# Patient Record
Sex: MALE | Race: WHITE | NOT HISPANIC OR LATINO | Employment: STUDENT | URBAN - METROPOLITAN AREA
[De-identification: names, ages, dates, MRNs, and addresses within clinical notes are randomized per-mention and may not be internally consistent; named-entity substitution may affect disease eponyms.]

---

## 2023-10-05 ENCOUNTER — HOSPITAL ENCOUNTER (OUTPATIENT)
Dept: CT IMAGING | Facility: CLINIC | Age: 28
Discharge: HOME | End: 2023-10-05
Attending: NURSE PRACTITIONER | Admitting: NURSE PRACTITIONER
Payer: COMMERCIAL

## 2023-10-05 ENCOUNTER — OFFICE VISIT (OUTPATIENT)
Dept: FAMILY MEDICINE | Facility: CLINIC | Age: 28
End: 2023-10-05
Payer: COMMERCIAL

## 2023-10-05 ENCOUNTER — HOSPITAL ENCOUNTER (EMERGENCY)
Facility: CLINIC | Age: 28
Discharge: HOME | End: 2023-10-05
Attending: EMERGENCY MEDICINE | Admitting: EMERGENCY MEDICINE
Payer: COMMERCIAL

## 2023-10-05 VITALS
OXYGEN SATURATION: 97 % | WEIGHT: 182 LBS | SYSTOLIC BLOOD PRESSURE: 120 MMHG | TEMPERATURE: 98.2 F | BODY MASS INDEX: 23.36 KG/M2 | HEIGHT: 74 IN | RESPIRATION RATE: 18 BRPM | HEART RATE: 80 BPM | DIASTOLIC BLOOD PRESSURE: 66 MMHG

## 2023-10-05 VITALS
HEART RATE: 75 BPM | DIASTOLIC BLOOD PRESSURE: 74 MMHG | OXYGEN SATURATION: 98 % | TEMPERATURE: 98.5 F | SYSTOLIC BLOOD PRESSURE: 144 MMHG | RESPIRATION RATE: 16 BRPM

## 2023-10-05 DIAGNOSIS — Z11.4 SCREENING FOR HIV (HUMAN IMMUNODEFICIENCY VIRUS): Primary | ICD-10-CM

## 2023-10-05 DIAGNOSIS — J03.90 TONSILLITIS: ICD-10-CM

## 2023-10-05 DIAGNOSIS — D72.825 BANDEMIA: ICD-10-CM

## 2023-10-05 DIAGNOSIS — Z11.59 NEED FOR HEPATITIS C SCREENING TEST: ICD-10-CM

## 2023-10-05 DIAGNOSIS — J36 PERITONSILLAR ABSCESS: ICD-10-CM

## 2023-10-05 DIAGNOSIS — J02.0 STREPTOCOCCAL SORE THROAT: ICD-10-CM

## 2023-10-05 PROBLEM — F15.10 AMPHETAMINE ABUSE (H): Status: ACTIVE | Noted: 2022-09-28

## 2023-10-05 LAB
ERYTHROCYTE [DISTWIDTH] IN BLOOD BY AUTOMATED COUNT: 12 % (ref 10–15)
HCT VFR BLD AUTO: 40.4 % (ref 40–53)
HGB BLD-MCNC: 13.5 G/DL (ref 13.3–17.7)
MCH RBC QN AUTO: 30.1 PG (ref 26.5–33)
MCHC RBC AUTO-ENTMCNC: 33.4 G/DL (ref 31.5–36.5)
MCV RBC AUTO: 90 FL (ref 78–100)
PLATELET # BLD AUTO: 442 10E3/UL (ref 150–450)
RBC # BLD AUTO: 4.49 10E6/UL (ref 4.4–5.9)
WBC # BLD AUTO: 17.1 10E3/UL (ref 4–11)

## 2023-10-05 PROCEDURE — 36415 COLL VENOUS BLD VENIPUNCTURE: CPT | Performed by: NURSE PRACTITIONER

## 2023-10-05 PROCEDURE — 250N000009 HC RX 250: Performed by: NURSE PRACTITIONER

## 2023-10-05 PROCEDURE — 42700 I&D ABSCESS PERITONSILLAR: CPT

## 2023-10-05 PROCEDURE — 70491 CT SOFT TISSUE NECK W/DYE: CPT

## 2023-10-05 PROCEDURE — 85027 COMPLETE CBC AUTOMATED: CPT | Performed by: NURSE PRACTITIONER

## 2023-10-05 PROCEDURE — 99203 OFFICE O/P NEW LOW 30 MIN: CPT | Performed by: NURSE PRACTITIONER

## 2023-10-05 PROCEDURE — 250N000011 HC RX IP 250 OP 636: Performed by: NURSE PRACTITIONER

## 2023-10-05 PROCEDURE — 99283 EMERGENCY DEPT VISIT LOW MDM: CPT | Mod: 25

## 2023-10-05 RX ORDER — PREDNISONE 20 MG/1
20 TABLET ORAL DAILY
Qty: 5 TABLET | Refills: 0 | Status: SHIPPED | OUTPATIENT
Start: 2023-10-05 | End: 2023-10-10

## 2023-10-05 RX ORDER — CEPHALEXIN 500 MG/1
500 CAPSULE ORAL 4 TIMES DAILY
Qty: 40 CAPSULE | Refills: 0 | Status: SHIPPED | OUTPATIENT
Start: 2023-10-05 | End: 2023-10-05

## 2023-10-05 RX ORDER — IOPAMIDOL 755 MG/ML
100 INJECTION, SOLUTION INTRAVASCULAR ONCE
Status: COMPLETED | OUTPATIENT
Start: 2023-10-05 | End: 2023-10-05

## 2023-10-05 RX ADMIN — SODIUM CHLORIDE 60 ML: 9 INJECTION, SOLUTION INTRAVENOUS at 15:08

## 2023-10-05 RX ADMIN — IOPAMIDOL 100 ML: 755 INJECTION, SOLUTION INTRAVENOUS at 15:08

## 2023-10-05 ASSESSMENT — ENCOUNTER SYMPTOMS: SORE THROAT: 1

## 2023-10-05 ASSESSMENT — ACTIVITIES OF DAILY LIVING (ADL): ADLS_ACUITY_SCORE: 35

## 2023-10-05 NOTE — PROGRESS NOTES
Answers submitted by the patient for this visit:  General Questionnaire (Submitted on 10/5/2023)  Chief Complaint: Chronic problems general questions HPI Form  How many servings of fruits and vegetables do you eat daily?: 2-3  On average, how many sweetened beverages do you drink each day (Examples: soda, juice, sweet tea, etc.  Do NOT count diet or artificially sweetened beverages)?: 0  How many minutes a day do you exercise enough to make your heart beat faster?: 30 to 60  How many days a week do you exercise enough to make your heart beat faster?: 5  How many days per week do you miss taking your medication?: 0  General Concern (Submitted on 10/5/2023)  Chief Complaint: Chronic problems general questions HPI Form  What is the reason for your visit today?: sore throat  When did your symptoms begin?: 3-4 weeks ago  What are your symptoms?: sore throat, fever, chills,bodyaches,fatigue  How would you describe these symptoms?: Severe  Are your symptoms:: Worsening  Have you had these symptoms before?: No

## 2023-10-05 NOTE — ED PROVIDER NOTES
History     Chief Complaint:  Abnormal Imaging       The history is provided by the patient.      Dany Vasquez is a 28 year old male who presents with a tonsillar abscess that was diagnosed in clinic earlier today. Patient reports that his symptoms began 3 weeks ago with a sore throat. He was seen for this and was prescribed a Z-olvin with incomplete relief. At the time, his strep test was negative. Shortly after this, he had increased throat swelling and airway restriction, so he was seen in clinic today where a CT was performed revealing an abscess. Alongside his sore throat, he has had pain with swallowing, fever, and chills. He has been using ibuprofen for his pain with incomplete relief. Dany has been able to swallow food and water with some difficulty. No allergies to medications. He has had strep throat a couple of times in the past.    Independent Historian:   None - Patient Only    Review of External Notes:   I reviewed a clinic note from earlier today.  I reviewed the CT scan from today:  IMPRESSION:    1. Enlarged and edematous tonsils, particularly the left palatine  tonsil, suggestive of tonsillitis. Heterogeneous fluid collection in  the left palatine tonsil measuring up to 2.5 cm concerning for a  peritonsillar abscess.  2. Cervical lymphadenopathy, likely reactive to presumed infectious  tonsillitis. No necrotic lymph nodes. Clinical follow-up is warranted.  3. Mucosal thickening throughout the paranasal sinuses with air-fluid  layers in the maxillary sinuses. This could represent acute sinusitis.     Results discussed with Eliza Nieto at 3:33 PM on 10/5/2023.     Medications:    Keflex  Deltasone  Z-olvin  Wellbutrin XL  Naltrexone    Past Medical History:    Ankle fracture  Amphetamine abuse  Mucocele of salivary gland   Contact dermatitis and other eczema   Nocturnal enuresis     Past Surgical History:    Ankle surgery, right     Physical Exam   Patient Vitals for the past 24 hrs:    BP Temp Temp src Pulse Resp SpO2   10/05/23 1555 (!) 144/74 98.5  F (36.9  C) Temporal 75 16 98 %      Physical Exam  Gen:  Pleasant, appears stated age.    ENT:  Moist mucus membranes.  Normal tongue.  Bilateral tonsillar hypertrophy, left tonsil with exudate.  Deviation of the uvula to the right, with obvious swelling to the left peritonsillar area.    No anterior cervical JAJA.  Tracheal cartilage non-tender.  Full flexion and extension of neck.  Thickened voice.  No stridor.  No drooling.    Musculoskeletal:     Normal movement of all extremities without evidence for deficit.    Extremities:    No edema.  Atraumatic.      Skin:   Warm and dry.  No rash.    Neurologic:    Non-focal exam without asymmetric weakness or numbness.    Fluent speech.    Psychiatric:     Normal affect with appropriate interaction with examiner.      Emergency Department Course     Procedures       Incision and Drainage     Procedure: Incision and Drainage     Consent: Verbal    Indication: Abscess    Location: Throat    Size: 2.5 cm    Ultrasound Guidance: No    Preparation: None     Anesthesia/Sedation: Bupivacaine - 0.5%     Procedure Detail:    Aspiration: Yes, 3 ml of purulent fluid was removed.    Patient Status: The patient tolerated the procedure well: Yes. There were no complications.    Emergency Department Course & Assessments:       Assessments:  1639 I obtained history and examined the patient as noted above.     Independent Interpretation (X-rays, CTs, rhythm strip):  I reviewed the CT soft tissue neck images.    Consultations/Discussion of Management or Tests:  7655 I spoke with Dr. Mendoza from the intensivist service at [facility] regarding the patient's presentation and plan of care.    Social Determinants of Health affecting care:   None    Disposition:  The patient was discharged to home.     Impression & Plan      Medical Decision Making:  Dany Vasquez is a 28 year old male, otherwise healthy, who  presents today with swelling in the left throat, known left peritonsillar abscess following a CT scan in the outpatient setting.  On exam, the patient is afebrile, no respiratory distress, mild trismus.  Peritonsillar abscess was drained as above with purulent drainage and relief of symptoms.  We discussed findings of sinusitis on the CT scan.  He feels better, and I recommended changing antibiotics to Augmentin rather than Keflex.  Return to the ED right away for increased pain, difficulty swallowing, fever, or other concerns.    Diagnosis:    ICD-10-CM    1. Peritonsillar abscess  J36          Discharge Medications:  Discharge Medication List as of 10/5/2023  5:27 PM        START taking these medications    Details   amoxicillin-clavulanate (AUGMENTIN) 875-125 MG tablet Take 1 tablet by mouth 2 times daily for 10 days, Disp-20 tablet, R-0, Local Print            Scribe Disclosure:  I, Tonio Weiss, am serving as a scribe at 4:53 PM on 10/5/2023 to document services personally performed by Natasha Varela MD, based on my observations and the provider's statements to me.   10/5/2023   Natasha Varela MD Pepper, Tracy Lynn, MD  10/06/23 1254

## 2023-10-05 NOTE — PROGRESS NOTES
Assessment & Plan     Screening for HIV (human immunodeficiency virus)  deferred    Need for hepatitis C screening test  deferred    Streptococcal sore throat  - check labs, ?bacterial vs viral infection  - CBC with platelets  - CBC with platelets    Bandemia  - will treat leukocytosis and antibiotics and prednisone for the posterior pharynx swelling  - predniSONE (DELTASONE) 20 MG tablet  Dispense: 5 tablet; Refill: 0    Tonsillitis  - due to presentation of deviated palpate and exudative left tonsil along with voice change and air way compromise will get stat CT scan for possible peritonsillar abscess with low threshold for refer to ED for management                                   .   - predniSONE (DELTASONE) 20 MG tablet  Dispense: 5 tablet; Refill: 0    Eliza Nieto NP  [facility] is a 28 year old, presenting for the following health issues:  Pharyngitis        10/5/2023     9:56 AM   Additional Questions   Roomed by Pinky PALOMINO CMA       History of Present Illness       Reason for visit:  Sore throat  Symptom onset:  3-4 weeks ago  Symptoms include:  Sore throat, fever, chills,bodyaches,fatigue  Symptom intensity:  Severe  Symptom progression:  Worsening  Had these symptoms before:  No    He eats 2-3 servings of fruits and vegetables daily.He consumes 0 sweetened beverage(s) daily.He exercises with enough effort to increase his heart rate 30 to 60 minutes per day.  He exercises with enough effort to increase his heart rate 5 days per week.   He is taking medications regularly.     COVID home test 2 weeks ago, NEG.   [facility] neg strep test 2 weeks ago. Had some antibiotics Z-pack.  Did not get better.     He is having fevers, hard to breath with his throat, he had tried OTC medications and it is not helping, he state that his voice is being affected by his throat. Covid test was negative, strep swab was negative and that at the [facility] clinic.  "Denies upset stomach, vomiting. + headaches. He does still have his tonsils. He does have ear pain as well. He is a non smoker.     Review of Systems   HENT:  Positive for sore throat.       Constitutional, HEENT, cardiovascular, pulmonary, gi and gu systems are negative, except as otherwise noted.      Objective    /66   Pulse 80   Temp 98.2  F (36.8  C) (Oral)   Resp 18   Ht 1.867 m (6' 1.5\")   Wt 82.6 kg (182 lb)   SpO2 97%   BMI 23.69 kg/m    Body mass index is 23.69 kg/m .  Physical Exam   GENERAL: alert, no distress, and ill appearing  EYES: Eyes grossly normal to inspection, PERRL and conjunctivae and sclerae normal  HENT: normal cephalic/atraumatic, both ears: clear effusion and bulging membrane, nasal mucosa edematous , tonsillar hypertrophy, tonsillar erythema, tonsillar exudate, and left deviation of palate with copious exudative material of left tonsil with marked hypertrophy and redness to left tonsil   NECK: bilateral anterior cervical adenopathy  RESP: lungs clear to auscultation - no rales, rhonchi or wheezes  CV: regular rate and rhythm, normal S1 S2, no S3 or S4, no murmur, click or rub, no peripheral edema and peripheral pulses strong  ABDOMEN: soft, nontender, no hepatosplenomegaly, no masses and bowel sounds normal  MS: no gross musculoskeletal defects noted, no edema  NEURO: Normal strength and tone, mentation intact and speech normal  PSYCH: mentation appears normal, affect normal/bright  LYMPH: anterior cervical: positive                      "

## 2023-10-05 NOTE — ED TRIAGE NOTES
Patient was referred to the ED after a Ct scan showed a tonsillar abscess today at the clinic. Patient stated that he has had a sore throat for about three weeks.      Triage Assessment       Row Name 10/05/23 0096       Triage Assessment (Adult)    Airway WDL WDL       Respiratory WDL    Respiratory WDL WDL       Skin Circulation/Temperature WDL    Skin Circulation/Temperature WDL WDL       Cardiac WDL    Cardiac WDL WDL       Peripheral/Neurovascular WDL    Peripheral Neurovascular WDL WDL       Cognitive/Neuro/Behavioral WDL    Cognitive/Neuro/Behavioral WDL WDL

## 2024-04-17 ENCOUNTER — OFFICE VISIT (OUTPATIENT)
Dept: FAMILY MEDICINE | Facility: CLINIC | Age: 29
End: 2024-04-17
Payer: COMMERCIAL

## 2024-04-17 VITALS
TEMPERATURE: 97.2 F | RESPIRATION RATE: 18 BRPM | SYSTOLIC BLOOD PRESSURE: 120 MMHG | OXYGEN SATURATION: 99 % | DIASTOLIC BLOOD PRESSURE: 79 MMHG | HEART RATE: 69 BPM | WEIGHT: 184.8 LBS | BODY MASS INDEX: 22.98 KG/M2 | HEIGHT: 75 IN

## 2024-04-17 DIAGNOSIS — F19.90 SUBSTANCE USE: ICD-10-CM

## 2024-04-17 DIAGNOSIS — L30.8 OTHER ECZEMA: ICD-10-CM

## 2024-04-17 DIAGNOSIS — F51.02 ADJUSTMENT INSOMNIA: ICD-10-CM

## 2024-04-17 DIAGNOSIS — B07.8 COMMON WART: Primary | ICD-10-CM

## 2024-04-17 PROCEDURE — 90715 TDAP VACCINE 7 YRS/> IM: CPT | Performed by: NURSE PRACTITIONER

## 2024-04-17 PROCEDURE — 99213 OFFICE O/P EST LOW 20 MIN: CPT | Mod: 25 | Performed by: NURSE PRACTITIONER

## 2024-04-17 PROCEDURE — 91320 SARSCV2 VAC 30MCG TRS-SUC IM: CPT | Performed by: NURSE PRACTITIONER

## 2024-04-17 PROCEDURE — 90480 ADMN SARSCOV2 VAC 1/ONLY CMP: CPT | Performed by: NURSE PRACTITIONER

## 2024-04-17 PROCEDURE — 90686 IIV4 VACC NO PRSV 0.5 ML IM: CPT | Performed by: NURSE PRACTITIONER

## 2024-04-17 PROCEDURE — 90472 IMMUNIZATION ADMIN EACH ADD: CPT | Performed by: NURSE PRACTITIONER

## 2024-04-17 PROCEDURE — 90471 IMMUNIZATION ADMIN: CPT | Performed by: NURSE PRACTITIONER

## 2024-04-17 RX ORDER — TRIAMCINOLONE ACETONIDE 0.25 MG/G
OINTMENT TOPICAL 2 TIMES DAILY
Qty: 15 G | Refills: 1 | Status: SHIPPED | OUTPATIENT
Start: 2024-04-17

## 2024-04-17 RX ORDER — HYDROXYZINE HYDROCHLORIDE 25 MG/1
25 TABLET, FILM COATED ORAL 3 TIMES DAILY PRN
Qty: 90 TABLET | Refills: 0 | Status: SHIPPED | OUTPATIENT
Start: 2024-04-17

## 2024-04-17 NOTE — PROGRESS NOTES
Assessment & Plan     Common wart  X1 cluster of warts on left 4th knuckle  X1 wart on ventral aspect of 4th left finger  X1 wart right plant of foot  - Adult Dermatology  Referral     - Cryotherapy performed today, see procedure note below     Procedures Performed:   - Cryotherapy procedure note, location(s): plant of right foot, metatarsal area, X1 cluster of warts on left 4th knuckle,dorsal area of hand, X1 wart on ventral aspect of 4th left finger. After verbal consent and discussion of risks and benefits including, but not limited to, dyspigmentation/scar, blister, and pain, 3 verruca lesion(s) was(were) treated with 1-2 mm freeze border for 2 cycles with liquid nitrogen. Post cryotherapy instructions were provided.    Other eczema  X1 scaly patch on right calf- not a wart. Will treat with triamcinolone for 2 weeks  - triamcinolone (KENALOG) 0.025 % external ointment  Dispense: 15 g; Refill: 1    Adjustment insomnia  Substance Use   - hydrOXYzine HCl (ATARAX) 25 MG tablet  Dispense: 90 tablet; Refill: 0  Hx of polysubstance abuse and stopped amphetamines several years ago. Most recently was using Kradum- stopped 6 days ago. Wishing to start a medication to help him with withdrawal symptoms and also to help him sleep as it is hard to falls sleep now that he is off Kradum.  Has hx impatient treatment outside state a few years ago for other drug use.  Not wishing referral to mental health or chemical dependency - he plans to start going back to recovery meetings. He will return to clinic for referral if having difficulty with relapsing using drugs.                      .    Sudha   Dany is a 28 year old, presenting for the following health issues:  OFFICE VISIT  and Recheck Medication (Pt Is here for office visit )        4/17/2024    10:23 AM   Additional Questions   Roomed by jevon         4/17/2024    10:23 AM   Patient Reported Additional Medications   Patient reports taking the following  "new medications no     Dany presents for persistent warts, wishing referral to dermatology.  Tried home remedies and compound W for almost 3 months.    X1 cluster of warts on left 4th knuckle  X1 wart on ventral aspect of 4th left finger  X1 wart right plant of foot  X1 patch of dry skin/wart? on right calf    Hx of polysubstance abuse. Most recently was using Kradum- stopped 6 days ago. Wishing to start a medication to help him with withdrawal symptoms and also to help him sleep as it is hard to falls sleep now that he is off Kradum.  Has hx impatient treatment outside state a few years ago for other drug use.  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  He exercises with enough effort to increase his heart rate 4 days per week.   He is taking medications regularly.                     Objective    /79 (BP Location: Left arm, Patient Position: Sitting, Cuff Size: Adult Regular)   Pulse 69   Temp 97.2  F (36.2  C) (Tympanic)   Resp 18   Ht 1.905 m (6' 3\")   Wt 83.8 kg (184 lb 12.8 oz)   SpO2 99%   BMI 23.10 kg/m    Body mass index is 23.1 kg/m .  Physical Exam   GENERAL: alert and no distress  NECK: no adenopathy, no asymmetry, masses, or scars  RESP: lungs clear to auscultation - no rales, rhonchi or wheezes  CV: regular rate and rhythm, normal S1 S2, no S3 or S4, no murmur, click or rub, no peripheral edema  ABDOMEN: soft, nontender, no hepatosplenomegaly, no masses and bowel sounds normal  MS: no gross musculoskeletal defects noted, no edema  SKIN: no suspicious lesions or rashes and warts            Signed " Electronically by: KATALINA Núñez CNP

## 2024-04-17 NOTE — NURSING NOTE
Prior to immunization administration, verified patients identity using patient s name and date of birth. Please see Immunization Activity for additional information.     Screening Questionnaire for Adult Immunization    Are you sick today?   No   Do you have allergies to medications, food, a vaccine component or latex?   No   Have you ever had a serious reaction after receiving a vaccination?   No   Do you have a long-term health problem with heart, lung, kidney, or metabolic disease (e.g., diabetes), asthma, a blood disorder, no spleen, complement component deficiency, a cochlear implant, or a spinal fluid leak?  Are you on long-term aspirin therapy?   No   Do you have cancer, leukemia, HIV/AIDS, or any other immune system problem?   No   Do you have a parent, brother, or sister with an immune system problem?   No   In the past 3 months, have you taken medications that affect  your immune system, such as prednisone, other steroids, or anticancer drugs; drugs for the treatment of rheumatoid arthritis, Crohn s disease, or psoriasis; or have you had radiation treatments?   No   Have you had a seizure, or a brain or other nervous system problem?   No   During the past year, have you received a transfusion of blood or blood    products, or been given immune (gamma) globulin or antiviral drug?   No   For women: Are you pregnant or is there a chance you could become       pregnant during the next month?   No   Have you received any vaccinations in the past 4 weeks?   No     Immunization questionnaire answers were all negative.      Pt tolerated injection (TDaP, & Covid 19 12+ Pfizer Comirnaty Vaccine). Site (Left & Right Deltoid) was cleansed with alcohol prior to injections. No pain, burning, swelling or redness at the site of the injection. Patient instructed to remain in clinic for 15 minutes afterwards, and to report any adverse reactions    Screening performed by Dianna Ramos RN on 4/17/2024 at 11:30  AM.

## 2024-05-04 ENCOUNTER — HEALTH MAINTENANCE LETTER (OUTPATIENT)
Age: 29
End: 2024-05-04

## 2024-05-09 ENCOUNTER — OFFICE VISIT (OUTPATIENT)
Dept: FAMILY MEDICINE | Facility: CLINIC | Age: 29
End: 2024-05-09
Payer: COMMERCIAL

## 2024-05-09 VITALS
DIASTOLIC BLOOD PRESSURE: 65 MMHG | TEMPERATURE: 98 F | OXYGEN SATURATION: 98 % | SYSTOLIC BLOOD PRESSURE: 111 MMHG | BODY MASS INDEX: 23.51 KG/M2 | RESPIRATION RATE: 14 BRPM | WEIGHT: 189.1 LBS | HEART RATE: 74 BPM | HEIGHT: 75 IN

## 2024-05-09 DIAGNOSIS — B07.8 COMMON WART: Primary | ICD-10-CM

## 2024-05-09 PROCEDURE — 99212 OFFICE O/P EST SF 10 MIN: CPT | Mod: 25 | Performed by: NURSE PRACTITIONER

## 2024-05-09 PROCEDURE — 17110 DESTRUCTION B9 LES UP TO 14: CPT | Performed by: NURSE PRACTITIONER

## 2024-05-09 ASSESSMENT — PAIN SCALES - GENERAL: PAINLEVEL: NO PAIN (0)

## 2024-05-09 NOTE — PROGRESS NOTES
Assessment & Plan     Dany was seen today for wart.    Diagnoses and all orders for this visit:    Common wart  -     DESTRUCT BENIGN LESION, UP TO 14     - Cryotherapy performed today, see procedure note below     Procedures Performed:   - Cryotherapy procedure note, location(s): x1 plantar wart on right foot, metatarsal area.    X1 cluster of warts on left 4th knuckle,dorsal area of hand  After verbal consent and discussion of risks and benefits including, but not limited to, dyspigmentation/scar, blister, pain, and risk for infection x1 cluster verruca lesion(s) on hand were treated with 1-2 mm freeze border with 2 cycles of liquid nitrogen. Plantar wart was paired with 10mm scalpel and then treated with 1-2 mm freeze border with 3 freeze and thaw cycles of liquid nitrogen.  Post cryotherapy instructions were provided.  Apply bacitracin if blisters develop and rupture to prevent infection.                    Subjective   Dany is a 29 year old, presenting for the following health issues:  Wart (Wart Removal. Left hand and bottom of right foot. /Pt does have a future dermatology appt: November 7, 2024)        5/9/2024     2:10 PM   Additional Questions   Roomed by Dianna Saenz presents for follow up- wart removal  Was seen 4/17/24 for this problem- he had tried 3 months of compound W treatment at home and unsuccessful treatment. During that visit- he received cryotherapy The wart on 4th finger resolved.  Has x1 cluster of warts on left 4th knuckle which got a little better but persistent.  Has x1 wart on right plan of foot and unchanged since last cryo therapy treatment.  He is wishing to repeat treatment.       History of Present Illness       Reason for visit:  Skin    He eats 2-3 servings of fruits and vegetables daily.He consumes 0 sweetened beverage(s) daily.He exercises with enough effort to increase his heart rate 30 to 60 minutes per day.  He exercises with enough effort to increase his heart rate  "4 days per week.   He is taking medications regularly.                     Objective    /65 (BP Location: Left arm, Patient Position: Sitting, Cuff Size: Adult Regular)   Pulse 74   Temp 98  F (36.7  C) (Tympanic)   Resp 14   Ht 1.905 m (6' 3\")   Wt 85.8 kg (189 lb 1.6 oz)   SpO2 98%   BMI 23.64 kg/m    Body mass index is 23.64 kg/m .  Physical Exam   GENERAL: alert and no distress  EYES: Eyes grossly normal to inspection, PERRL and conjunctivae and sclerae normal  MS: no gross musculoskeletal defects noted, no edema  SKIN: no suspicious lesions or rashes and warts  PSYCH: mentation appears normal, affect normal/bright            Signed Electronically by: KATALINA Núñez CNP    "

## 2024-11-05 NOTE — PROGRESS NOTES
[facility] Dermatology Note  Encounter Date: Nov 7, 2024  Office Visit     Dermatology Problem List:  # Verattilaca vulgaris, right calf  - LN2 11/7/2024  - 40% Sal-Acid, 5-FU cream  # Tinea versicolor  - Ketoconazole shampoo, cream    ____________________________________________    Assessment & Plan:   # Verruca vulgaris, right calf.  Although somewhat atypical location for verruca vulgaris, the hyperkeratotic nature and thrombosed vessels are most consistent with verruca vulgaris.  No history of psoriasis or atopic dermatitis, and given lack of pruritus have a lower suspicion for nummular dermatitis.  Also favor verruca given the presence of other warts (now resolved).    Discussed management options including biopsy, which would likely be therapeutic and diagnostic, versus treating empirically as a wart.  Patient elects for the latter.  Will treat with liquid nitrogen therapy today and provide topicals to use at home if needed.  - Cryotherapy (see procedure note below)  - Start 40% salicylic acid plaster nightly.  Cut to size and apply over wart  - Start 5-fluorouracil cream every morning.  Apply followed by a Band-Aid  - If does not resolve, biopsy indicated    # Tinea versicolor.   Discussed etiology and recommended antifungal/antiyeast topicals.  - Start ketoconazole shampoo 3 days weekly at first, then weekly for maintenance  - Start ketoconazole cream twice daily as needed for spot treatment     Procedures Performed:     - Cryotherapy procedure note, location(s): Right calf. After verbal consent and discussion of risks and benefits including, but not limited to, dyspigmentation/scar, blister, and pain, 1 lesion(s) was(were) treated with 1-2 mm freeze border for 1-2 cycles with liquid nitrogen. Post cryotherapy instructions were provided.    Follow-up: 4 to 6 weeks in person to follow-up verruca vulgaris (patient can cancel if resolved)    Staff: Dr. Amelia Schmidt MD  PGY-4  Dermatology Resident    Patient was seen and examined with the dermatology resident. I agree with the history, review of systems, physical examination, assessments and plan. I was present for the entire cryotherapy procedure.    Aleah Cisneros MD  Professor   Department of Dermatology  [facility]     ____________________________________________    CC: Derm Problem (Abdominal rash - has been around for 4-5 months, itches, flares with heat and moisture/Wart - back of L calf)    HPI:  Mr. Dany Vasquez is a(n) 29 year old male who presents today as a new patient for evaluation of warts.     Dany had warts on his hand, treated by PCP with LN2 and resolved.  However, he has a papule on the back of the right calf/ankle that has been there for the last several years.  Unchanging and asymptomatic.  No history of trauma there.  Not treated.  Wondering if it is a wart.    Also has a rash on his trunk that comes and goes, worsened with sweating or after swimming in a pool.  Can be itchy.     Patient is otherwise feeling well, without additional skin concerns.    Labs Reviewed:  N/A    Physical Exam:  Vitals: There were no vitals taken for this visit.  SKIN: Focused examination of the hands, right calf, abdomen, back was performed.  - on the abdomen and back there are several salmon-colored pink papules with overlying white fine wispy scale  - On the right calf there is a 1 cm hyperkeratotic scaly plaque with a few thrombosed vessels on dermoscopy  - Hands clear      Medications:  Current Outpatient Medications   Medication Sig Dispense Refill    hydrOXYzine HCl (ATARAX) 25 MG tablet Take 1 tablet (25 mg) by mouth 3 times daily as needed for itching (Patient not taking: Reported on 11/7/2024) 90 tablet 0    naloxone (NARCAN) 4 MG/0.1ML nasal spray Spray 4 mg into one nostril alternating nostrils as needed for opioid reversal every 2-3 minutes until assistance arrives (Patient not taking: Reported on  11/7/2024)      triamcinolone (KENALOG) 0.025 % external ointment Apply topically 2 times daily For 2 weeks (Patient not taking: Reported on 11/7/2024) 15 g 1     No current facility-administered medications for this visit.      Past Medical History:   Patient Active Problem List   Diagnosis    Ankle pain, right    Aftercare following surgery of the musculoskeletal system    Ankle fracture    Amphetamine abuse (H)     History reviewed. No pertinent past medical history.    CC KATALINA Núñez CNP  [address] [place] [address] on close of this encounter.

## 2024-11-07 ENCOUNTER — OFFICE VISIT (OUTPATIENT)
Dept: DERMATOLOGY | Facility: CLINIC | Age: 29
End: 2024-11-07
Attending: NURSE PRACTITIONER
Payer: COMMERCIAL

## 2024-11-07 DIAGNOSIS — B36.0 TV (TINEA VERSICOLOR): Primary | ICD-10-CM

## 2024-11-07 DIAGNOSIS — B07.8 COMMON WART: ICD-10-CM

## 2024-11-07 DIAGNOSIS — L30.9 DERMATITIS: ICD-10-CM

## 2024-11-07 PROCEDURE — 99203 OFFICE O/P NEW LOW 30 MIN: CPT | Mod: 25 | Performed by: DERMATOLOGY

## 2024-11-07 PROCEDURE — 17110 DESTRUCTION B9 LES UP TO 14: CPT | Mod: GC | Performed by: DERMATOLOGY

## 2024-11-07 RX ORDER — KETOCONAZOLE 20 MG/ML
SHAMPOO, SUSPENSION TOPICAL
Qty: 120 ML | Refills: 11 | Status: SHIPPED | OUTPATIENT
Start: 2024-11-07

## 2024-11-07 RX ORDER — KETOCONAZOLE 20 MG/G
CREAM TOPICAL
Qty: 60 G | Refills: 3 | Status: SHIPPED | OUTPATIENT
Start: 2024-11-07

## 2024-11-07 RX ORDER — FLUOROURACIL 50 MG/G
CREAM TOPICAL
Qty: 40 G | Refills: 0 | Status: SHIPPED | OUTPATIENT
Start: 2024-11-07

## 2024-11-07 ASSESSMENT — PAIN SCALES - GENERAL: PAINLEVEL_OUTOF10: NO PAIN (0)

## 2024-11-07 NOTE — NURSING NOTE
Dermatology Rooming Note    Dany Vasquez's goals for this visit include:   Chief Complaint   Patient presents with    Derm Problem     Abdominal rash - has been around for 4-5 months, itches, flares with heat and moisture  Wart - back of GISSELLE Hicks, EMT

## 2024-11-07 NOTE — LETTER
11/7/2024       RE: Dany Vasquez  [address]  [facility] [address]     Dear Colleague,    Thank you for referring your patient, Dany Vasquez, to the [facility] DERMATOLOGY CLINIC [place] at [facility]. Please see a copy of my visit note below.    [facility] Dermatology Note  Encounter Date: Nov 7, 2024  Office Visit     Dermatology Problem List:  # Verruca vulgaris, right calf  - LN2 11/7/2024  - 40% Sal-Acid, 5-FU cream  # Tinea versicolor  - Ketoconazole shampoo, cream    ____________________________________________    Assessment & Plan:   # Verruca vulgaris, right calf.  Although somewhat atypical location for verruca vulgaris, the hyperkeratotic nature and thrombosed vessels are most consistent with verruca vulgaris.  No history of psoriasis or atopic dermatitis, and given lack of pruritus have a lower suspicion for nummular dermatitis.  Also favor verruca given the presence of other warts (now resolved).    Discussed management options including biopsy, which would likely be therapeutic and diagnostic, versus treating empirically as a wart.  Patient elects for the latter.  Will treat with liquid nitrogen therapy today and provide topicals to use at home if needed.  - Cryotherapy (see procedure note below)  - Start 40% salicylic acid plaster nightly.  Cut to size and apply over wart  - Start 5-fluorouracil cream every morning.  Apply followed by a Band-Aid  - If does not resolve, biopsy indicated    # Tinea versicolor.   Discussed etiology and recommended antifungal/antiyeast topicals.  - Start ketoconazole shampoo 3 days weekly at first, then weekly for maintenance  - Start ketoconazole cream twice daily as needed for spot treatment     Procedures Performed:     - Cryotherapy procedure note, location(s): Right calf. After verbal consent and discussion of risks and benefits including, but not limited to,  dyspigmentation/scar, blister, and pain, 1 lesion(s) was(were) treated with 1-2 mm freeze border for 1-2 cycles with liquid nitrogen. Post cryotherapy instructions were provided.    Follow-up: 4 to 6 weeks in person to follow-up verruca vulgaris (patient can cancel if resolved)    Staff: Dr. Amelia Schmidt MD PGY-4  Dermatology Resident    Patient was seen and examined with the dermatology resident. I agree with the history, review of systems, physical examination, assessments and plan. I was present for the entire cryotherapy procedure.    Aleah Cisneros MD  Professor   Department of Dermatology  [facility]     ____________________________________________    CC: Derm Problem (Abdominal rash - has been around for 4-5 months, itches, flares with heat and moisture/Wart - back of L calf)    HPI:  Mr. Dany Vasquez is a(n) 29 year old male who presents today as a new patient for evaluation of warts.     Dany had warts on his hand, treated by PCP with LN2 and resolved.  However, he has a papule on the back of the right calf/ankle that has been there for the last several years.  Unchanging and asymptomatic.  No history of trauma there.  Not treated.  Wondering if it is a wart.    Also has a rash on his trunk that comes and goes, worsened with sweating or after swimming in a pool.  Can be itchy.     Patient is otherwise feeling well, without additional skin concerns.    Labs Reviewed:  N/A    Physical Exam:  Vitals: There were no vitals taken for this visit.  SKIN: Focused examination of the hands, right calf, abdomen, back was performed.  - on the abdomen and back there are several salmon-colored pink papules with overlying white fine wispy scale  - On the right calf there is a 1 cm hyperkeratotic scaly plaque with a few thrombosed vessels on dermoscopy  - Hands clear      Medications:  Current Outpatient Medications   Medication Sig Dispense Refill     hydrOXYzine HCl (ATARAX) 25 MG  tablet Take 1 tablet (25 mg) by mouth 3 times daily as needed for itching (Patient not taking: Reported on 11/7/2024) 90 tablet 0     naloxone (NARCAN) 4 MG/0.1ML nasal spray Spray 4 mg into one nostril alternating nostrils as needed for opioid reversal every 2-3 minutes until assistance arrives (Patient not taking: Reported on 11/7/2024)       triamcinolone (KENALOG) 0.025 % external ointment Apply topically 2 times daily For 2 weeks (Patient not taking: Reported on 11/7/2024) 15 g 1     No current facility-administered medications for this visit.      Past Medical History:   Patient Active Problem List   Diagnosis     Ankle pain, right     Aftercare following surgery of the musculoskeletal system     Ankle fracture     Amphetamine abuse (H)     History reviewed. No pertinent past medical history.    CC KATALINA Núñez CNP  [address] [place] [address] on close of this encounter.      Again, thank you for allowing me to participate in the care of your patient.      Sincerely,    Masood Schmidt MD

## 2024-11-07 NOTE — PATIENT INSTRUCTIONS
Cryotherapy    What is it?  Use of a very cold liquid, such as liquid nitrogen, to freeze and destroy abnormal skin cells that need to be removed    What should I expect?  Tenderness and redness  A small blister that might grow and fill with dark purple blood. There may be crusting.  More than one treatment may be needed if the lesions do not go away.    How do I care for the treated area?  Gently wash the area with your hands when bathing.  Use a thin layer of Vaseline to help with healing. You may use a Band-Aid.   The area should heal within 7-10 days and may leave behind a pink or lighter color.   Do not use an antibiotic or Neosporin ointment.   You may take acetaminophen (Tylenol) for pain.     Call your doctor if you have:  Severe pain  Signs of infection (warmth, redness, cloudy yellow drainage, and or a bad smell)  Questions or concerns    Who should I call with questions?      [facility]: [phone]      [facility]: [phone]      For urgent needs outside of business hours call the [facility] at [phone] and ask for the dermatology resident on call

## 2024-12-13 ENCOUNTER — OFFICE VISIT (OUTPATIENT)
Dept: FAMILY MEDICINE | Facility: CLINIC | Age: 29
End: 2024-12-13
Payer: COMMERCIAL

## 2024-12-13 VITALS
BODY MASS INDEX: 24.12 KG/M2 | RESPIRATION RATE: 16 BRPM | DIASTOLIC BLOOD PRESSURE: 71 MMHG | SYSTOLIC BLOOD PRESSURE: 120 MMHG | HEIGHT: 75 IN | TEMPERATURE: 97.6 F | HEART RATE: 67 BPM | WEIGHT: 194 LBS | OXYGEN SATURATION: 98 %

## 2024-12-13 DIAGNOSIS — R53.83 FATIGUE, UNSPECIFIED TYPE: ICD-10-CM

## 2024-12-13 DIAGNOSIS — Z11.4 SCREENING FOR HIV (HUMAN IMMUNODEFICIENCY VIRUS): ICD-10-CM

## 2024-12-13 DIAGNOSIS — L65.9 LOSS OF HAIR: Primary | ICD-10-CM

## 2024-12-13 LAB
ALBUMIN SERPL BCG-MCNC: 4.6 G/DL (ref 3.5–5.2)
ALP SERPL-CCNC: 34 U/L (ref 40–150)
ALT SERPL W P-5'-P-CCNC: 19 U/L (ref 0–70)
ANION GAP SERPL CALCULATED.3IONS-SCNC: 8 MMOL/L (ref 7–15)
AST SERPL W P-5'-P-CCNC: 18 U/L (ref 0–45)
BILIRUB SERPL-MCNC: 0.5 MG/DL
BUN SERPL-MCNC: 16.2 MG/DL (ref 6–20)
CALCIUM SERPL-MCNC: 9.5 MG/DL (ref 8.8–10.4)
CHLORIDE SERPL-SCNC: 104 MMOL/L (ref 98–107)
CREAT SERPL-MCNC: 1.28 MG/DL (ref 0.67–1.17)
EGFRCR SERPLBLD CKD-EPI 2021: 79 ML/MIN/1.73M2
ERYTHROCYTE [DISTWIDTH] IN BLOOD BY AUTOMATED COUNT: 11.9 % (ref 10–15)
GLUCOSE SERPL-MCNC: 88 MG/DL (ref 70–99)
HCO3 SERPL-SCNC: 28 MMOL/L (ref 22–29)
HCT VFR BLD AUTO: 44.6 % (ref 40–53)
HGB BLD-MCNC: 14.5 G/DL (ref 13.3–17.7)
HIV 1+2 AB+HIV1 P24 AG SERPL QL IA: NONREACTIVE
MCH RBC QN AUTO: 28.8 PG (ref 26.5–33)
MCHC RBC AUTO-ENTMCNC: 32.5 G/DL (ref 31.5–36.5)
MCV RBC AUTO: 89 FL (ref 78–100)
PLATELET # BLD AUTO: 263 10E3/UL (ref 150–450)
POTASSIUM SERPL-SCNC: 4.7 MMOL/L (ref 3.4–5.3)
PROT SERPL-MCNC: 7 G/DL (ref 6.4–8.3)
RBC # BLD AUTO: 5.04 10E6/UL (ref 4.4–5.9)
SODIUM SERPL-SCNC: 140 MMOL/L (ref 135–145)
TSH SERPL DL<=0.005 MIU/L-ACNC: 0.54 UIU/ML (ref 0.3–4.2)
WBC # BLD AUTO: 3.9 10E3/UL (ref 4–11)

## 2024-12-13 PROCEDURE — 84443 ASSAY THYROID STIM HORMONE: CPT | Performed by: NURSE PRACTITIONER

## 2024-12-13 PROCEDURE — 85027 COMPLETE CBC AUTOMATED: CPT | Performed by: NURSE PRACTITIONER

## 2024-12-13 PROCEDURE — 84403 ASSAY OF TOTAL TESTOSTERONE: CPT | Performed by: NURSE PRACTITIONER

## 2024-12-13 PROCEDURE — 80053 COMPREHEN METABOLIC PANEL: CPT | Performed by: NURSE PRACTITIONER

## 2024-12-13 PROCEDURE — 36415 COLL VENOUS BLD VENIPUNCTURE: CPT | Performed by: NURSE PRACTITIONER

## 2024-12-13 PROCEDURE — 87389 HIV-1 AG W/HIV-1&-2 AB AG IA: CPT | Performed by: NURSE PRACTITIONER

## 2024-12-13 PROCEDURE — 99213 OFFICE O/P EST LOW 20 MIN: CPT | Performed by: NURSE PRACTITIONER

## 2024-12-13 ASSESSMENT — ENCOUNTER SYMPTOMS: FATIGUE: 1

## 2024-12-13 ASSESSMENT — PAIN SCALES - GENERAL: PAINLEVEL_OUTOF10: NO PAIN (0)

## 2024-12-13 NOTE — PROGRESS NOTES
Assessment & Plan     Fatigue, unspecified type  Loss of hair  Chronic fatigue and diffuse  hair loss - will get labs to r/o underlying disease  If normal labs can consider referral to endocrinology -pending results.   - CBC with platelets  - TSH with free T4 reflex  - Comprehensive metabolic panel  - Testosterone total  - CBC with platelets  - TSH with free T4 reflex  - Comprehensive metabolic panel  - Lab Blood Morphology Pathologist Review    Screening for HIV (human immunodeficiency virus)  - HIV Antigen Antibody Combo  - HIV Antigen Antibody Combo          Sudha Saenz is a 29 year old, presenting for the following health issues:  Fatigue, Hair/Scalp Problem, and Recheck Medication (Pt reports that he's here to discuss hair loss and feeling fatigue for about 6 months.)        12/13/2024    11:36 AM   Additional Questions   Roomed by sukh   Accompanied by alone         12/13/2024    11:36 AM   Patient Reported Additional Medications   Patient reports taking the following new medications none       Fatigue and brain fog  Hair thinking all over head- no specific bald spots -   Voice feels hoarse  A little bit of weight gain  Family hx of hypothyroid    Hx of opoid use, cannabis, drinking alcohol, amphetamine use hx  Has been clean for a year    Anxiety - symptoms have been good  Not feeling depressed     Going to bed about 11-12 pm gets up by 7am  Has been sleeping well- about 6-8 hours in general- no snoring    Exercising 30-60 min 5 times a week  Eating well balanced diet- lots of vegetables     History of Present Illness       Reason for visit:  New symptoms  Symptom onset:  More than a month  Symptoms include:  Hair loss and fatigue  Symptom intensity:  Moderate  Symptom progression:  Worsening  Had these symptoms before:  No  What makes it worse:  Diet changes  What makes it better:  Exercise   He is taking medications regularly.                     Objective    /71 (BP Location: Left arm,  "Patient Position: Sitting, Cuff Size: Adult Regular)   Pulse 67   Temp 97.6  F (36.4  C) (Axillary)   Resp 16   Ht 1.892 m (6' 2.5\")   Wt 88 kg (194 lb)   SpO2 98%   BMI 24.57 kg/m    Body mass index is 24.57 kg/m .  Physical Exam               Signed Electronically by: KATALINA Núñez CNP    "

## 2024-12-17 LAB — TESTOST SERPL-MCNC: 644 NG/DL (ref 240–950)

## 2024-12-29 ENCOUNTER — MYC MEDICAL ADVICE (OUTPATIENT)
Dept: FAMILY MEDICINE | Facility: CLINIC | Age: 29
End: 2024-12-29
Payer: COMMERCIAL

## 2024-12-29 DIAGNOSIS — Z01.89 PATIENT REQUEST FOR DIAGNOSTIC TESTING: Primary | ICD-10-CM

## 2025-01-13 ENCOUNTER — LAB (OUTPATIENT)
Dept: LAB | Facility: CLINIC | Age: 30
End: 2025-01-13
Payer: COMMERCIAL

## 2025-01-13 DIAGNOSIS — R53.83 FATIGUE, UNSPECIFIED TYPE: ICD-10-CM

## 2025-01-13 DIAGNOSIS — Z01.89 PATIENT REQUEST FOR DIAGNOSTIC TESTING: ICD-10-CM

## 2025-01-13 DIAGNOSIS — L65.9 LOSS OF HAIR: ICD-10-CM

## 2025-01-13 LAB
BASOPHILS # BLD AUTO: 0 10E3/UL (ref 0–0.2)
BASOPHILS NFR BLD AUTO: 1 %
EOSINOPHIL # BLD AUTO: 0.2 10E3/UL (ref 0–0.7)
EOSINOPHIL NFR BLD AUTO: 3 %
ERYTHROCYTE [DISTWIDTH] IN BLOOD BY AUTOMATED COUNT: 12.1 % (ref 10–15)
FOLATE SERPL-MCNC: 12.7 NG/ML (ref 4.6–34.8)
HCT VFR BLD AUTO: 44.6 % (ref 40–53)
HGB BLD-MCNC: 14.9 G/DL (ref 13.3–17.7)
IMM GRANULOCYTES # BLD: 0 10E3/UL
IMM GRANULOCYTES NFR BLD: 0 %
LYMPHOCYTES # BLD AUTO: 1.9 10E3/UL (ref 0.8–5.3)
LYMPHOCYTES NFR BLD AUTO: 30 %
MCH RBC QN AUTO: 29.2 PG (ref 26.5–33)
MCHC RBC AUTO-ENTMCNC: 33.4 G/DL (ref 31.5–36.5)
MCV RBC AUTO: 87 FL (ref 78–100)
MONOCYTES # BLD AUTO: 0.6 10E3/UL (ref 0–1.3)
MONOCYTES NFR BLD AUTO: 9 %
NEUTROPHILS # BLD AUTO: 3.6 10E3/UL (ref 1.6–8.3)
NEUTROPHILS NFR BLD AUTO: 57 %
PLATELET # BLD AUTO: 261 10E3/UL (ref 150–450)
RBC # BLD AUTO: 5.11 10E6/UL (ref 4.4–5.9)
RETICS # AUTO: 0.06 10E6/UL (ref 0.03–0.1)
RETICS/RBC NFR AUTO: 1.1 % (ref 0.5–2)
T3FREE SERPL-MCNC: 3.1 PG/ML (ref 2–4.4)
T4 FREE SERPL-MCNC: 1.37 NG/DL (ref 0.9–1.7)
VIT B12 SERPL-MCNC: 734 PG/ML (ref 232–1245)
WBC # BLD AUTO: 6.3 10E3/UL (ref 4–11)

## 2025-01-13 PROCEDURE — 36415 COLL VENOUS BLD VENIPUNCTURE: CPT

## 2025-01-13 PROCEDURE — 85025 COMPLETE CBC W/AUTO DIFF WBC: CPT

## 2025-01-13 PROCEDURE — 85060 BLOOD SMEAR INTERPRETATION: CPT | Performed by: PATHOLOGY

## 2025-01-13 PROCEDURE — 86039 ANTINUCLEAR ANTIBODIES (ANA): CPT

## 2025-01-13 PROCEDURE — 82746 ASSAY OF FOLIC ACID SERUM: CPT

## 2025-01-13 PROCEDURE — 85045 AUTOMATED RETICULOCYTE COUNT: CPT

## 2025-01-13 PROCEDURE — 84439 ASSAY OF FREE THYROXINE: CPT

## 2025-01-13 PROCEDURE — 84481 FREE ASSAY (FT-3): CPT

## 2025-01-13 PROCEDURE — 82607 VITAMIN B-12: CPT

## 2025-01-13 PROCEDURE — 86038 ANTINUCLEAR ANTIBODIES: CPT

## 2025-01-13 PROCEDURE — 86376 MICROSOMAL ANTIBODY EACH: CPT

## 2025-01-14 ENCOUNTER — OFFICE VISIT (OUTPATIENT)
Dept: FAMILY MEDICINE | Facility: CLINIC | Age: 30
End: 2025-01-14
Payer: COMMERCIAL

## 2025-01-14 VITALS
WEIGHT: 194.8 LBS | BODY MASS INDEX: 24.22 KG/M2 | OXYGEN SATURATION: 99 % | DIASTOLIC BLOOD PRESSURE: 58 MMHG | HEIGHT: 75 IN | SYSTOLIC BLOOD PRESSURE: 132 MMHG | HEART RATE: 65 BPM | TEMPERATURE: 97.5 F | RESPIRATION RATE: 20 BRPM

## 2025-01-14 DIAGNOSIS — Z11.59 NEED FOR HEPATITIS C SCREENING TEST: ICD-10-CM

## 2025-01-14 DIAGNOSIS — Z00.00 ROUTINE GENERAL MEDICAL EXAMINATION AT A HEALTH CARE FACILITY: Primary | ICD-10-CM

## 2025-01-14 DIAGNOSIS — L65.9 HAIR LOSS: ICD-10-CM

## 2025-01-14 DIAGNOSIS — R79.89 ELEVATED SERUM CREATININE: ICD-10-CM

## 2025-01-14 DIAGNOSIS — Z86.39 HISTORY OF VITAMIN D DEFICIENCY: ICD-10-CM

## 2025-01-14 LAB
ANA PAT SER IF-IMP: ABNORMAL
ANA SER QL IF: ABNORMAL
ANA TITR SER IF: ABNORMAL {TITER}
CREAT SERPL-MCNC: 1.44 MG/DL (ref 0.67–1.17)
EGFRCR SERPLBLD CKD-EPI 2021: 68 ML/MIN/1.73M2
HCV AB SERPL QL IA: NONREACTIVE
PATH REPORT.COMMENTS IMP SPEC: NORMAL
PATH REPORT.FINAL DX SPEC: NORMAL
PATH REPORT.MICROSCOPIC SPEC OTHER STN: NORMAL
PATH REPORT.RELEVANT HX SPEC: NORMAL
THYROPEROXIDASE AB SERPL-ACNC: 21 IU/ML
VIT D+METAB SERPL-MCNC: 126 NG/ML (ref 20–50)

## 2025-01-14 PROCEDURE — 86803 HEPATITIS C AB TEST: CPT | Performed by: NURSE PRACTITIONER

## 2025-01-14 PROCEDURE — 99213 OFFICE O/P EST LOW 20 MIN: CPT | Mod: 25 | Performed by: NURSE PRACTITIONER

## 2025-01-14 PROCEDURE — 99395 PREV VISIT EST AGE 18-39: CPT | Mod: 25 | Performed by: NURSE PRACTITIONER

## 2025-01-14 PROCEDURE — 90471 IMMUNIZATION ADMIN: CPT | Performed by: NURSE PRACTITIONER

## 2025-01-14 PROCEDURE — 82306 VITAMIN D 25 HYDROXY: CPT | Performed by: NURSE PRACTITIONER

## 2025-01-14 PROCEDURE — 90480 ADMN SARSCOV2 VAC 1/ONLY CMP: CPT | Performed by: NURSE PRACTITIONER

## 2025-01-14 PROCEDURE — 91320 SARSCV2 VAC 30MCG TRS-SUC IM: CPT | Performed by: NURSE PRACTITIONER

## 2025-01-14 PROCEDURE — 36415 COLL VENOUS BLD VENIPUNCTURE: CPT | Performed by: NURSE PRACTITIONER

## 2025-01-14 PROCEDURE — 90656 IIV3 VACC NO PRSV 0.5 ML IM: CPT | Performed by: NURSE PRACTITIONER

## 2025-01-14 PROCEDURE — 82565 ASSAY OF CREATININE: CPT | Performed by: NURSE PRACTITIONER

## 2025-01-14 SDOH — HEALTH STABILITY: PHYSICAL HEALTH: ON AVERAGE, HOW MANY MINUTES DO YOU ENGAGE IN EXERCISE AT THIS LEVEL?: 40 MIN

## 2025-01-14 SDOH — HEALTH STABILITY: PHYSICAL HEALTH: ON AVERAGE, HOW MANY DAYS PER WEEK DO YOU ENGAGE IN MODERATE TO STRENUOUS EXERCISE (LIKE A BRISK WALK)?: 4 DAYS

## 2025-01-14 ASSESSMENT — SOCIAL DETERMINANTS OF HEALTH (SDOH): HOW OFTEN DO YOU GET TOGETHER WITH FRIENDS OR RELATIVES?: THREE TIMES A WEEK

## 2025-01-14 NOTE — PROGRESS NOTES
"  {PROVIDER CHARTING PREFERENCE:524440}    Sudha Saenz is a 29 year old, presenting for the following health issues:  Follow Up (Discuss recent labs)        1/14/2025     1:28 PM   Additional Questions   Roomed by IRMA Garcia BSN     History of Present Illness       Reason for visit:  Physical    He eats 2-3 servings of fruits and vegetables daily.He consumes 0 sweetened beverage(s) daily.He exercises with enough effort to increase his heart rate 30 to 60 minutes per day.  He exercises with enough effort to increase his heart rate 4 days per week.   He is taking medications regularly.       {MA/LPN/RN Pre-Provider Visit Orders- hCG/UA/Strep (Optional):014671}  {SUPERLIST (Optional):373764}  {additonal problems for provider to add (Optional):971459}    {ROS Picklists (Optional):140404}      Objective    /58 (BP Location: Left arm, Patient Position: Sitting, Cuff Size: Adult Regular)   Pulse 65   Temp 97.5  F (36.4  C) (Tympanic)   Resp 20   Ht 1.892 m (6' 2.5\")   Wt 88.4 kg (194 lb 12.8 oz)   SpO2 99%   BMI 24.68 kg/m    Body mass index is 24.68 kg/m .  Physical Exam   {Exam List (Optional):418705}    {Diagnostic Test Results (Optional):769887}        Signed Electronically by: KATALINA Núñez CNP  {Email feedback regarding this note to [email or web address]   :883004}  "

## 2025-01-14 NOTE — PATIENT INSTRUCTIONS
Patient Education   Preventive Care Advice   This is general advice given by our system to help you stay healthy. However, your care team may have specific advice just for you. Please talk to your care team about your preventive care needs.  Nutrition  Eat 5 or more servings of fruits and vegetables each day.  Try wheat bread, brown rice and whole grain pasta (instead of white bread, rice, and pasta).  Get enough calcium and vitamin D. Check the label on foods and aim for 100% of the RDA (recommended daily allowance).  Lifestyle  Exercise at least 150 minutes each week  (30 minutes a day, 5 days a week).  Do muscle strengthening activities 2 days a week. These help control your weight and prevent disease.  No smoking.  Wear sunscreen to prevent skin cancer.  Have a dental exam and cleaning every 6 months.  Yearly exams  See your health care team every year to talk about:  Any changes in your health.  Any medicines your care team has prescribed.  Preventive care, family planning, and ways to prevent chronic diseases.  Shots (vaccines)   HPV shots (up to age 26), if you've never had them before.  Hepatitis B shots (up to age 59), if you've never had them before.  COVID-19 shot: Get this shot when it's due.  Flu shot: Get a flu shot every year.  Tetanus shot: Get a tetanus shot every 10 years.  Pneumococcal, hepatitis A, and RSV shots: Ask your care team if you need these based on your risk.  Shingles shot (for age 50 and up)  General health tests  Diabetes screening:  Starting at age 35, Get screened for diabetes at least every 3 years.  If you are younger than age 35, ask your care team if you should be screened for diabetes.  Cholesterol test: At age 39, start having a cholesterol test every 5 years, or more often if advised.  Bone density scan (DEXA): At age 50, ask your care team if you should have this scan for osteoporosis (brittle bones).  Hepatitis C: Get tested at least once in your life.  STIs (sexually  transmitted infections)  Before age 24: Ask your care team if you should be screened for STIs.  After age 24: Get screened for STIs if you're at risk. You are at risk for STIs (including HIV) if:  You are sexually active with more than one person.  You don't use condoms every time.  You or a partner was diagnosed with a sexually transmitted infection.  If you are at risk for HIV, ask about PrEP medicine to prevent HIV.  Get tested for HIV at least once in your life, whether you are at risk for HIV or not.  Cancer screening tests  Cervical cancer screening: If you have a cervix, begin getting regular cervical cancer screening tests starting at age 21.  Breast cancer scan (mammogram): If you've ever had breasts, begin having regular mammograms starting at age 40. This is a scan to check for breast cancer.  Colon cancer screening: It is important to start screening for colon cancer at age 45.  Have a colonoscopy test every 10 years (or more often if you're at risk) Or, ask your provider about stool tests like a FIT test every year or Cologuard test every 3 years.  To learn more about your testing options, visit:   .  For help making a decision, visit:   https://bit.ly/st24847.  Prostate cancer screening test: If you have a prostate, ask your care team if a prostate cancer screening test (PSA) at age 55 is right for you.  Lung cancer screening: If you are a current or former smoker ages 50 to 80, ask your care team if ongoing lung cancer screenings are right for you.  For informational purposes only. Not to replace the advice of your health care provider. Copyright   2023 [employer]. All rights reserved. Clinically reviewed by the [facility] Transitions Program. [employer] 788913 - REV 01/24.

## 2025-01-14 NOTE — PROGRESS NOTES
Preventive Care Visit  [facility]  KATALINA Núñez CNP, Family Medicine  Jan 14, 2025      Assessment & Plan     Routine general medical examination at a health care facility  In [facility] general medardo  Preventive Care Advice was given as reflected on patient instructions  Up to date with blood work and vaccines      Need for hepatitis C screening test  - Hepatitis C Screen Reflex to HCV RNA Quant and Genotype  - Hepatitis C Screen Reflex to HCV RNA Quant and Genotype    History of vitamin D deficiency  - Vitamin D Deficiency  - Vitamin D Deficiency  - Vitamin D Deficiency    Hair loss  Diffuse hair loss - recent blood work with normal CBC and TSH -   - minoxidil (ROGAINE) 2 % external solution  Dispense: 60 mL; Refill: 1  - Adult Dermatology  Referral    Elevated serum creatinine  - Creatinine  - Creatinine  - Creatinine              Counseling  Appropriate preventive services were addressed with this patient via screening, questionnaire, or discussion as appropriate for fall prevention, nutrition, physical activity, Tobacco-use cessation, social engagement, weight loss and cognition.  Checklist reviewing preventive services available has been given to the patient.  Reviewed patient's diet, addressing concerns and/or questions.           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      1/14/2025   General Health   How would you rate your overall physical health? Good   Feel stress (tense, anxious, or unable to sleep) Not at all         1/14/2025   Nutrition   Three or more servings of calcium each day? Yes   Diet: Regular (no restrictions)   How many servings of fruit and vegetables per day? (!) 2-3   How many sweetened beverages each day? 0-1         1/14/2025   Exercise   Days per week of moderate/strenous exercise 4 days   Average minutes spent exercising at this level 40 min         1/14/2025   Social Factors   Frequency of gathering with friends or relatives Three times a week   Worry food won't last until get money to buy more No   Food not last or not have enough money for food? No   Do you have housing? (Housing is defined as stable permanent housing and does not include staying ouside in a car, in a tent, in an abandoned building, in an overnight shelter, or couch-surfing.) Yes   Are you worried about losing your housing? No   Lack of transportation? No   Unable to get utilities (heat,electricity)? No         1/14/2025   Dental   Dentist two times every year? Yes         1/14/2025   TB Screening   Were you born outside of the [place]? No         Today's PHQ-2 Score:       1/14/2025     1:17 PM   PHQ-2 ( 1999 [employer])   Q1: Little interest or pleasure in doing things 0   Q2: Feeling down, depressed or hopeless 0   PHQ-2 Score 0    Q1: Little interest or pleasure in doing things Not at all   Q2: Feeling down, depressed or hopeless Not at all   PHQ-2 Score 0       Patient-reported           1/14/2025   Substance Use   Alcohol more than 3/day or more than 7/wk No   Do you use any other substances recreationally? No     Social History     Tobacco Use    Smoking status: Never    Smokeless tobacco: Never   Vaping Use    Vaping status: Never Used   Substance Use Topics    Alcohol use: No    Drug use: No           1/14/2025   STI Screening   New sexual  "partner(s) since last STI/HIV test? No         1/14/2025   Contraception/Family Planning   Questions about contraception or family planning No        Reviewed and updated as needed this visit by Provider   Tobacco  Allergies  Meds  Problems  Med Hx  Surg Hx  Fam Hx                     Objective    Exam  /58 (BP Location: Left arm, Patient Position: Sitting, Cuff Size: Adult Regular)   Pulse 65   Temp 97.5  F (36.4  C) (Tympanic)   Resp 20   Ht 1.892 m (6' 2.5\")   Wt 88.4 kg (194 lb 12.8 oz)   SpO2 99%   BMI 24.68 kg/m     Estimated body mass index is 24.68 kg/m  as calculated from the following:    Height as of this encounter: 1.892 m (6' 2.5\").    Weight as of this encounter: 88.4 kg (194 lb 12.8 oz).    Physical Exam  GENERAL: alert and no distress  EYES: Eyes grossly normal to inspection, PERRL and conjunctivae and sclerae normal  HENT: ear canals and TM's normal, nose and mouth without ulcers or lesions  NECK: no adenopathy, no asymmetry, masses, or scars  RESP: lungs clear to auscultation - no rales, rhonchi or wheezes  CV: regular rate and rhythm, normal S1 S2, no S3 or S4, no murmur, click or rub, no peripheral edema  ABDOMEN: soft, nontender, no hepatosplenomegaly, no masses and bowel sounds normal  MS: no gross musculoskeletal defects noted, no edema  SKIN: no suspicious lesions or rashes. Diffuse hair loss  NEURO: Normal strength and tone, mentation intact and speech normal  PSYCH: mentation appears normal, affect normal/bright        Signed Electronically by: KATALINA Núñez CNP    "

## 2025-01-15 PROBLEM — F15.10 AMPHETAMINE ABUSE (H): Status: RESOLVED | Noted: 2022-09-28 | Resolved: 2025-01-15

## 2025-01-15 PROBLEM — F15.10 AMPHETAMINE ABUSE (H): Status: ACTIVE | Noted: 2022-09-28

## 2025-01-15 PROBLEM — F15.11 HISTORY OF AMPHETAMINE ABUSE (H): Status: ACTIVE | Noted: 2025-01-15

## 2025-01-22 ENCOUNTER — MYC MEDICAL ADVICE (OUTPATIENT)
Dept: FAMILY MEDICINE | Facility: CLINIC | Age: 30
End: 2025-01-22
Payer: COMMERCIAL

## 2025-01-22 DIAGNOSIS — R30.0 DYSURIA: Primary | ICD-10-CM

## 2025-02-25 ENCOUNTER — LAB (OUTPATIENT)
Dept: LAB | Facility: CLINIC | Age: 30
End: 2025-02-25
Payer: COMMERCIAL

## 2025-02-25 DIAGNOSIS — R30.0 DYSURIA: ICD-10-CM

## 2025-02-25 LAB
ALBUMIN UR-MCNC: NEGATIVE MG/DL
APPEARANCE UR: CLEAR
BILIRUB UR QL STRIP: NEGATIVE
COLOR UR AUTO: YELLOW
GLUCOSE UR STRIP-MCNC: NEGATIVE MG/DL
HGB UR QL STRIP: NEGATIVE
KETONES UR STRIP-MCNC: NEGATIVE MG/DL
LEUKOCYTE ESTERASE UR QL STRIP: NEGATIVE
NITRATE UR QL: NEGATIVE
PH UR STRIP: 6.5 [PH] (ref 5–7)
SP GR UR STRIP: 1.02 (ref 1–1.03)
UROBILINOGEN UR STRIP-ACNC: 0.2 E.U./DL

## 2025-02-25 PROCEDURE — 81003 URINALYSIS AUTO W/O SCOPE: CPT

## 2025-03-18 NOTE — PROGRESS NOTES
[facility] Dermatology Note  Encounter Date: Mar 20, 2025  Office Visit     Dermatology Problem List:  # Verruca vulgaris, right calf  - LN2 11/7/2024  - 40% Sal-Acid, 5-FU cream  # Tinea versicolor  - Ketoconazole shampoo, cream  # Androgenetic alopecia  - Current treatment: Oral minoxidil    ____________________________________________    Assessment & Plan:   # Hyperkeratotic papule, right calf.  Previously treated with liquid nitrogen therapy in November 2024 due to suspected verruca vulgaris.  Now nearly resolved, with a single residual hyperkeratotic papule present at the inferior aspect.  Difficult to ascertain whether or not this is residual verruca versus a benign, hyperkeratotic process along the lines of a seborrheic keratosis or verrucous keratosis.  Offered retreatment with liquid nitrogen therapy today given improvement with this treatment modality previously.  However, through shared decision-making patient elects to continue to monitor as he is content with the improvement and is asymptomatic.  - Photos today  - Continue to monitor    # Tinea versicolor  Resolved with ketoconazole shampoo.  Recommend using the shampoo once per week in the shower as maintenance, particularly as the warmer summer months approach  - Ketoconazole 2% shampoo once weekly as needed to the scalp and body as maintenance    # Androgenetic alopecia, mild.  Prominently mild decrease in hair density on the vertex scalp and recession of the frontotemporal hair lines.  Interested in escalating from 2% minoxidil foam, which he has been using nightly for the last 3 months.  Discussed treatment options including escalating to 5% foam used twice daily versus oral minoxidil.  The latter medication poses potential side effects including lower extremity edema, pericardial effusion, hypertrichosis, and rarely tachycardia and/or arrhythmias.  It is generally considered a very safe medication, and there are no  contraindications in this case.  Through shared decision making, patient elects to start oral minoxidil.  - Start oral minoxidil at 2.5 mg daily for 1 month, increase to 5 mg daily if well-tolerated  - Stop topical minoxidil       Procedures Performed:   - none    Follow-up: 6 months in person to follow-up androgenetic alopecia    Staff: Dr. PADMINI Schmidt MD PGY-4  Dermatology Resident    ____________________________________________    CC: Derm Problem (Tinea Versicolor has gone almost entirely away. Patient reports success with treatment. )    HPI:  Mr. Dany Vasquez is a(n) 29 year old male who presents today as a return patient for follow up verruca of the R calf and tinea versicolor. Last seen 11/7/24.     Dany states that the wart on his right calf has improved after it was frozen in November.  There is still a little bit left.  Is not bothersome.  There are no other warts elsewhere, although previously he had a wart that was frozen by his PCP on his left hand.    He states that the tinea versicolor improved right away after starting the ketoconazole shampoo.    He would like to discuss options for treating early hair loss.  He has been using topical 2% minoxidil foam nightly for the past 3 months.  He notes most of the thinning diffusely on the crown and the vertex.    Patient is otherwise feeling well, without additional skin concerns.    Labs Reviewed:  N/A    Physical Exam:  Vitals: There were no vitals taken for this visit.  SKIN: Focused examination of scalp, face, right calf was performed.  -On the right calf there is a pink patch with a focal hyperkeratotic papule located at the inferior aspect.  There are no thrombosed vessels visualized  -Mild decreased hair density with preservation of follicular ostia on the vertex scalp, and very mild recession of the frontotemporal hairline  - No other lesions of concern on areas examined.                       Medications:  Current  Outpatient Medications   Medication Sig Dispense Refill    minoxidil (ROGAINE) 2 % external solution Apply topically 2 times daily. 60 mL 1     No current facility-administered medications for this visit.      Past Medical History:   Patient Active Problem List   Diagnosis    Ankle pain, right    Aftercare following surgery of the musculoskeletal system    Ankle fracture    History of amphetamine abuse (H)     History reviewed. No pertinent past medical history.    CC Aleah Cisneros MD  [address]  [place] [address] on close of this encounter.

## 2025-03-20 ENCOUNTER — OFFICE VISIT (OUTPATIENT)
Dept: DERMATOLOGY | Facility: CLINIC | Age: 30
End: 2025-03-20
Attending: DERMATOLOGY
Payer: COMMERCIAL

## 2025-03-20 DIAGNOSIS — L64.9 ANDROGENETIC ALOPECIA: ICD-10-CM

## 2025-03-20 DIAGNOSIS — B36.0 TV (TINEA VERSICOLOR): ICD-10-CM

## 2025-03-20 DIAGNOSIS — B07.9 VERRUCA VULGARIS: Primary | ICD-10-CM

## 2025-03-20 RX ORDER — MINOXIDIL 2.5 MG/1
TABLET ORAL
Qty: 60 TABLET | Refills: 6 | Status: SHIPPED | OUTPATIENT
Start: 2025-03-20

## 2025-03-20 ASSESSMENT — PAIN SCALES - GENERAL: PAINLEVEL_OUTOF10: NO PAIN (0)

## 2025-03-20 NOTE — LETTER
3/20/2025       RE: Dany Vasquez  [address]  [facility] [address]     Dear Colleague,    Thank you for referring your patient, Dany Vasquez, to the [facility] DERMATOLOG[facility] [place] at [facility]. Please see a copy of my visit note below.    [facility] Dermatology Note  Encounter Date: Mar 20, 2025  Office Visit     Dermatology Problem List:  # Verruca vulgaris, right calf  - LN2 11/7/2024  - 40% Sal-Acid, 5-FU cream  # Tinea versicolor  - Ketoconazole shampoo, cream  # Androgenetic alopecia  - Current treatment: Oral minoxidil    ____________________________________________    Assessment & Plan:   # Hyperkeratotic papule, right calf.  Previously treated with liquid nitrogen therapy in November 2024 due to suspected verruca vulgaris.  Now nearly resolved, with a single residual hyperkeratotic papule present at the inferior aspect.  Difficult to ascertain whether or not this is residual verruca versus a benign, hyperkeratotic process along the lines of a seborrheic keratosis or verrucous keratosis.  Offered retreatment with liquid nitrogen therapy today given improvement with this treatment modality previously.  However, through shared decision-making patient elects to continue to monitor as he is content with the improvement and is asymptomatic.  - Photos today  - Continue to monitor    # Tinea versicolor  Resolved with ketoconazole shampoo.  Recommend using the shampoo once per week in the shower as maintenance, particularly as the warmer summer months approach  - Ketoconazole 2% shampoo once weekly as needed to the scalp and body as maintenance    # Androgenetic alopecia, mild.  Prominently mild decrease in hair density on the vertex scalp and recession of the frontotemporal hair lines.  Interested in escalating from 2% minoxidil foam, which he has been using nightly for the last 3 months.  Discussed treatment options  including escalating to 5% foam used twice daily versus oral minoxidil.  The latter medication poses potential side effects including lower extremity edema, pericardial effusion, hypertrichosis, and rarely tachycardia and/or arrhythmias.  It is generally considered a very safe medication, and there are no contraindications in this case.  Through shared decision making, patient elects to start oral minoxidil.  - Start oral minoxidil at 2.5 mg daily for 1 month, increase to 5 mg daily if well-tolerated  - Stop topical minoxidil       Procedures Performed:   - none    Follow-up: 6 months in person to follow-up androgenetic alopecia    Staff: Dr. PADMINI Schmidt MD PGY-4  Dermatology Resident    ____________________________________________    CC: Derm Problem (Tinea Versicolor has gone almost entirely away. Patient reports success with treatment. )    HPI:  Mr. Dany Hatfieldutier is a(n) 29 year old male who presents today as a return patient for follow up verruca of the R calf and tinea versicolor. Last seen 11/7/24.     Dany states that the wart on his right calf has improved after it was frozen in November.  There is still a little bit left.  Is not bothersome.  There are no other warts elsewhere, although previously he had a wart that was frozen by his PCP on his left hand.    He states that the tinea versicolor improved right away after starting the ketoconazole shampoo.    He would like to discuss options for treating early hair loss.  He has been using topical 2% minoxidil foam nightly for the past 3 months.  He notes most of the thinning diffusely on the crown and the vertex.    Patient is otherwise feeling well, without additional skin concerns.    Labs Reviewed:  N/A    Physical Exam:  Vitals: There were no vitals taken for this visit.  SKIN: Focused examination of scalp, face, right calf was performed.  -On the right calf there is a pink patch with a focal hyperkeratotic papule located at the  inferior aspect.  There are no thrombosed vessels visualized  -Mild decreased hair density with preservation of follicular ostia on the vertex scalp, and very mild recession of the frontotemporal hairline  - No other lesions of concern on areas examined.                       Medications:  Current Outpatient Medications   Medication Sig Dispense Refill     minoxidil (ROGAINE) 2 % external solution Apply topically 2 times daily. 60 mL 1     No current facility-administered medications for this visit.      Past Medical History:   Patient Active Problem List   Diagnosis     Ankle pain, right     Aftercare following surgery of the musculoskeletal system     Ankle fracture     History of amphetamine abuse (H)     History reviewed. No pertinent past medical history.    CC Aleah Cisneros MD  [address] [facility]  [place] [address] on close of this encounter.    Attestation signed by Sduhakar Blanton MD at 3/20/2025  9:18 AM:  I have personally examined this patient and agree with the resident doctor's documentation and plan of care. I have reviewed and amended the resident's note. The documentation accurately reflects my clinical observations, diagnoses, treatment and follow-up plans.     Sudhakar Blanton MD  Dermatology Staff      Again, thank you for allowing me to participate in the care of your patient.      Sincerely,    Masood Schmidt MD

## 2025-03-20 NOTE — NURSING NOTE
Dermatology Rooming Note    Dany Vasquez's goals for this visit include:   Chief Complaint   Patient presents with    Derm Problem     Tinea Versicolor has gone almost entirely away. Patient reports success with treatment.      Ruddy Jimenez, EMT  Clinic Support  [facility]     [phone]    Employed by [employer]

## 2025-06-18 ENCOUNTER — TELEPHONE (OUTPATIENT)
Dept: DERMATOLOGY | Facility: CLINIC | Age: 30
End: 2025-06-18
Payer: COMMERCIAL

## 2025-06-18 NOTE — TELEPHONE ENCOUNTER
6/18 Left Voicemail (1st Attempt) and Sent Mychart (1st Attempt) for the patient to call back and schedule the following:    Appointment type: Return Dermatology  Provider: Kelin East  Return date: 09/20/2025  Specialty phone number: [phone]  Additional appointment(s) needed: na  Additonal Notes: Okay to use ANGEL per Aleah PALACIO

## 2025-07-22 ENCOUNTER — MYC REFILL (OUTPATIENT)
Dept: FAMILY MEDICINE | Facility: CLINIC | Age: 30
End: 2025-07-22
Payer: COMMERCIAL

## 2025-07-22 DIAGNOSIS — L65.9 HAIR LOSS: ICD-10-CM
